# Patient Record
Sex: MALE | Race: BLACK OR AFRICAN AMERICAN | Employment: UNEMPLOYED | URBAN - METROPOLITAN AREA
[De-identification: names, ages, dates, MRNs, and addresses within clinical notes are randomized per-mention and may not be internally consistent; named-entity substitution may affect disease eponyms.]

---

## 2017-07-01 ENCOUNTER — HOSPITAL ENCOUNTER (EMERGENCY)
Age: 32
Discharge: HOME | End: 2017-07-02
Attending: EMERGENCY MEDICINE | Admitting: EMERGENCY MEDICINE
Payer: SELF-PAY

## 2017-07-01 DIAGNOSIS — T40.1X1A HEROIN OVERDOSE, ACCIDENTAL OR UNINTENTIONAL, INITIAL ENCOUNTER (HCC): Primary | ICD-10-CM

## 2017-07-01 PROCEDURE — 93005 ELECTROCARDIOGRAM TRACING: CPT

## 2017-07-01 PROCEDURE — 74011250636 HC RX REV CODE- 250/636: Performed by: EMERGENCY MEDICINE

## 2017-07-01 PROCEDURE — 96360 HYDRATION IV INFUSION INIT: CPT

## 2017-07-01 PROCEDURE — 96361 HYDRATE IV INFUSION ADD-ON: CPT

## 2017-07-01 PROCEDURE — 99285 EMERGENCY DEPT VISIT HI MDM: CPT

## 2017-07-01 RX ADMIN — SODIUM CHLORIDE 1000 ML: 900 INJECTION, SOLUTION INTRAVENOUS at 22:34

## 2017-07-02 VITALS
TEMPERATURE: 98.1 F | RESPIRATION RATE: 11 BRPM | BODY MASS INDEX: 25.45 KG/M2 | HEART RATE: 81 BPM | OXYGEN SATURATION: 95 % | DIASTOLIC BLOOD PRESSURE: 67 MMHG | WEIGHT: 182.5 LBS | SYSTOLIC BLOOD PRESSURE: 93 MMHG

## 2017-07-02 LAB
ALBUMIN SERPL BCP-MCNC: 4 G/DL (ref 3.4–5)
ALBUMIN/GLOB SERPL: 1.1 {RATIO} (ref 0.8–1.7)
ALP SERPL-CCNC: 81 U/L (ref 45–117)
ALT SERPL-CCNC: 34 U/L (ref 16–61)
ANION GAP BLD CALC-SCNC: 11 MMOL/L (ref 3–18)
APAP SERPL-MCNC: <2 UG/ML (ref 10–30)
AST SERPL W P-5'-P-CCNC: 26 U/L (ref 15–37)
ATRIAL RATE: 97 BPM
BASOPHILS # BLD AUTO: 0 K/UL (ref 0–0.06)
BASOPHILS # BLD: 0 % (ref 0–2)
BILIRUB SERPL-MCNC: 0.6 MG/DL (ref 0.2–1)
BUN SERPL-MCNC: 6 MG/DL (ref 7–18)
BUN/CREAT SERPL: 5 (ref 12–20)
CALCIUM SERPL-MCNC: 8.7 MG/DL (ref 8.5–10.1)
CALCULATED P AXIS, ECG09: 64 DEGREES
CALCULATED R AXIS, ECG10: 65 DEGREES
CALCULATED T AXIS, ECG11: 42 DEGREES
CHLORIDE SERPL-SCNC: 107 MMOL/L (ref 100–108)
CK MB CFR SERPL CALC: 0.8 % (ref 0–4)
CK MB SERPL-MCNC: 1.6 NG/ML (ref 5–25)
CK SERPL-CCNC: 209 U/L (ref 39–308)
CO2 SERPL-SCNC: 24 MMOL/L (ref 21–32)
CREAT SERPL-MCNC: 1.27 MG/DL (ref 0.6–1.3)
DIAGNOSIS, 93000: NORMAL
DIFFERENTIAL METHOD BLD: ABNORMAL
EOSINOPHIL # BLD: 0 K/UL (ref 0–0.4)
EOSINOPHIL NFR BLD: 0 % (ref 0–5)
ERYTHROCYTE [DISTWIDTH] IN BLOOD BY AUTOMATED COUNT: 13.4 % (ref 11.6–14.5)
ETHANOL SERPL-MCNC: 67 MG/DL (ref 0–3)
GLOBULIN SER CALC-MCNC: 3.7 G/DL (ref 2–4)
GLUCOSE SERPL-MCNC: 75 MG/DL (ref 74–99)
HCT VFR BLD AUTO: 40.2 % (ref 36–48)
HGB BLD-MCNC: 13.1 G/DL (ref 13–16)
LYMPHOCYTES # BLD AUTO: 14 % (ref 21–52)
LYMPHOCYTES # BLD: 1.5 K/UL (ref 0.9–3.6)
MCH RBC QN AUTO: 28.5 PG (ref 24–34)
MCHC RBC AUTO-ENTMCNC: 32.6 G/DL (ref 31–37)
MCV RBC AUTO: 87.6 FL (ref 74–97)
MONOCYTES # BLD: 0.9 K/UL (ref 0.05–1.2)
MONOCYTES NFR BLD AUTO: 9 % (ref 3–10)
NEUTS SEG # BLD: 7.8 K/UL (ref 1.8–8)
NEUTS SEG NFR BLD AUTO: 77 % (ref 40–73)
P-R INTERVAL, ECG05: 182 MS
PLATELET # BLD AUTO: 240 K/UL (ref 135–420)
PMV BLD AUTO: 9.3 FL (ref 9.2–11.8)
POTASSIUM SERPL-SCNC: 4.3 MMOL/L (ref 3.5–5.5)
PROT SERPL-MCNC: 7.7 G/DL (ref 6.4–8.2)
Q-T INTERVAL, ECG07: 354 MS
QRS DURATION, ECG06: 82 MS
QTC CALCULATION (BEZET), ECG08: 449 MS
RBC # BLD AUTO: 4.59 M/UL (ref 4.7–5.5)
SALICYLATES SERPL-MCNC: 3.6 MG/DL (ref 2.8–20)
SODIUM SERPL-SCNC: 142 MMOL/L (ref 136–145)
TROPONIN I SERPL-MCNC: <0.02 NG/ML (ref 0–0.04)
VENTRICULAR RATE, ECG03: 97 BPM
WBC # BLD AUTO: 10.2 K/UL (ref 4.6–13.2)

## 2017-07-02 PROCEDURE — 80307 DRUG TEST PRSMV CHEM ANLYZR: CPT | Performed by: EMERGENCY MEDICINE

## 2017-07-02 PROCEDURE — 80053 COMPREHEN METABOLIC PANEL: CPT | Performed by: EMERGENCY MEDICINE

## 2017-07-02 PROCEDURE — 82550 ASSAY OF CK (CPK): CPT | Performed by: EMERGENCY MEDICINE

## 2017-07-02 PROCEDURE — 85025 COMPLETE CBC W/AUTO DIFF WBC: CPT | Performed by: EMERGENCY MEDICINE

## 2017-07-02 NOTE — ED TRIAGE NOTES
Patient found unresponsive with respiratory rate of 2.   Patient was given Narcan 2,mg and regained consciousness

## 2017-07-02 NOTE — ED PROVIDER NOTES
[facility] EMERGENCY DEPT      28 y.o. male with noted past medical history who presents to the emergency department via EMS due to a drug overdose just prior to arrival. Per EMS the pt was found unresponsive on the floor with agonal breathing. Pt admits to using IV Heroin. No other complaints. Current Facility-Administered Medications   Medication Dose Route Frequency    sodium chloride 0.9 % bolus infusion 1,000 mL  1,000 mL IntraVENous ONCE     Current Outpatient Prescriptions   Medication Sig    trimethoprim-sulfamethoxazole (BACTRIM DS, SEPTRA DS) 160-800 mg per tablet Take 1 Tab by mouth two (2) times a day.  traMADol (ULTRAM) 50 mg tablet Take 1 Tab by mouth every six (6) hours as needed for Pain. Max Daily Amount: 200 mg. History reviewed. No pertinent past medical history. History reviewed. No pertinent surgical history. History reviewed. No pertinent family history. Social History     Social History    Marital status: SINGLE     Spouse name: N/A    Number of children: N/A    Years of education: N/A     Occupational History    Not on file. Social History Main Topics    Smoking status: Current Every Day Smoker     Packs/day: 0.50    Smokeless tobacco: Never Used    Alcohol use Yes    Drug use: Yes     Special: Heroin    Sexual activity: Not on file     Other Topics Concern    Not on file     Social History Narrative       No Known Allergies    Patient's primary care provider (as noted in EPIC):  None    REVIEW OF SYSTEMS:    Constitutional:  Negative for diaphoresis. HENT:  Negative for congestion. Respiratory:  Negative for cough and shortness of breath. Cardiovascular:  Negative for chest pain and palpitations. Gastrointestinal:  Negative for diarrhea. Genitourinary:  Negative for flank pain. Musculoskeletal:  Negative for back pain. Skin:  Negative for pallor. Neurological:  Negative for focal numbness, weakness or tingling.   Negative for slurred speech. Visit Vitals    /58    Pulse (!) 105    Temp 98.1 °F (36.7 °C)    Resp 18    Wt 82.8 kg (182 lb 8 oz)    SpO2 93%    BMI 25.45 kg/m2       PHYSICAL EXAM:    CONSTITUTIONAL:  Alert, in no apparent distress;  well developed;  well nourished. HEAD:  Normocephalic, atraumatic. EYES:  EOMI. Non-icteric sclera. Normal conjunctiva. ENTM:  Nose:  no rhinorrhea. Throat:  no erythema or exudate, mucous membranes moist.  NECK:  No JVD. Supple  RESPIRATORY:  Chest clear, equal breath sounds, good air movement. CARDIOVASCULAR:  Regular rate and rhythm. No murmurs, rubs, or gallops. GI:  Normal bowel sounds, abdomen soft and non-tender. No rebound or guarding. BACK:  Non-tender. UPPER EXT:  Normal inspection. LOWER EXT:  No edema, no calf tenderness. Distal pulses intact. NEURO:  Moves all four extremities. Normal motor exam and sensation in all four extremities. Normal CN II-XII exam.  Normal bilateral finger-to-nose exam.     SKIN:  No rashes;  Normal for age. PSYCH:  Alert and normal affect. DIFFERENTIAL DIAGNOSES/ MEDICAL DECISION MAKING:  Hypoglycemia, acute alcohol, drug, or multipharmacy intoxication, sepsis from numerous possible sources including urosepsis, pneumonia, meningitis, significant CVA, TIA, intracerebral hemorrhage, subdural hemorrhage, seizure, significant trauma, electrolyte or hormonal imbalance, other etiologies, versus a combination of the above. However, highest on differential diagnoses in this patient is heroin overdose.     ED COURSE:      Abnormal lab results from this emergency department encounter:  Labs Reviewed   CBC WITH AUTOMATED DIFF   METABOLIC PANEL, COMPREHENSIVE   CARDIAC PANEL,(CK, CKMB & TROPONIN)   SALICYLATE   ACETAMINOPHEN   ETHYL ALCOHOL   DRUG SCREEN, URINE       Lab values for this patient within approximately the last 12 hours:  Recent Results (from the past 12 hour(s))   EKG, 12 LEAD, SUBSEQUENT    Collection Time: 07/01/17 10:47 PM   Result Value Ref Range    Ventricular Rate 97 BPM    Atrial Rate 97 BPM    P-R Interval 182 ms    QRS Duration 82 ms    Q-T Interval 354 ms    QTC Calculation (Bezet) 449 ms    Calculated P Axis 64 degrees    Calculated R Axis 65 degrees    Calculated T Axis 42 degrees    Diagnosis       Normal sinus rhythm  Possible Acute pericarditis  Abnormal ECG  No previous ECGs available         Radiologist and cardiologist interpretations if available at time of this note:  No results found. EKG reading by Chase Hooper MD:  NSR about 95 bpm with normal width QRS. No STEMI. No ST depression. Medication(s) ordered for patient during this emergency visit encounter:  Medications   sodium chloride 0.9 % bolus infusion 1,000 mL (not administered)       The patient's mental status was improved with narcan that was given by EMS in the field. During serial examinations in the emergency department, the patient mental status has improved and remained normal.      This was not a suicide attempt and thus the behavioral crisis nurse has not been consulted on this patient. Diagnoses:  1. Overdose of heroin. Plan:    1. Return if worse. 2. Follow up with your primary doctor in next 2-3 days. 3. Seek professional help for your heroin abuse. Daria Mac M.D. Provider Attestation:  If a scribe was utilized in generation of this patient record, I personally performed the services described in the documentation, reviewed the documentation, as recorded by the scribe in my presence, and it accurately records the patient's history of presenting illness, review of systems, patient physical examination, and procedures performed by me as the attending physician. Daria Mac M.D.   [facility] Board Certified Emergency Physician  7/1/2017.  10:34 PM

## 2017-07-02 NOTE — DISCHARGE INSTRUCTIONS
Plan:    1. Return if worse. 2. Follow up with your primary doctor in next 2-3 days. 3. Seek professional help for your heroin abuse. Learning About Naloxone for Opioid Overdose  What is naloxone? Opioids are strong pain medicines. Examples include hydrocodone, oxycodone, fentanyl, and morphine. Heroin is an example of an illegal opioid. Taking too much of an opioid can cause death. An overdose is an emergency. Naloxone is a medicine that reverses the effects of an overdose. If you take it soon enough after an overdose, it can save your life. Naloxone comes in a rescue kit you can carry with you. You may hear it called a Narcan kit for short. The rescue kit may contain:  · The medicine. · Syringes and needles. · A nasal adapter for the syringes. · A separate nasal spray device. Your doctor can give you a prescription for a rescue kit and show you how to use it. In some places you can buy Narcan kits without a prescription. When is naloxone used? Naloxone is used when a person shows signs of an opioid overdose. A person may have overdosed if he or she is:  · Sleepy or hard to wake up. · Confused. · Not breathing normally. Make sure your family and friends know about these signs of an overdose. If someone appears to have overdosed, call 911. A drug overdose is an emergency. How do you use it? If you overdose, you may not be able to give yourself the medicine. So it's very important to teach friends and family how and when to give it to you. Rescue kits come with instructions. There are two ways to give the medicine. Many rescue kits can be used for either method. The methods are:  · Injection with needle and syringe. ¨ Training may be needed in order to use this method correctly. ¨ Some rescue kits come with automatic injectors that don't require special training. ¨ The medicine can be injected through clothing. · Nasal spray. ¨ Many rescue kits come with a nasal adapter.  It attaches to the syringe and turns the medicine into a mist.  ¨ The mist is sprayed into the nose of a person who has overdosed. The person needs to be lying down when the mist is sprayed. Overdose symptoms may return a few minutes after the first dose from the rescue kit. If that happens, a second dose is needed. Rescue kits come with two doses for that reason. Keep your rescue kit with you always. You never know when you might need it. If you think you or someone else may have overdosed but you're not sure, use the kit anyway. Aside from going through withdrawal, which may be uncomfortable, there is no downside to using this medicine. Always go to the emergency room after using naloxone. Doctors will want to make sure the overdose has been reversed. Follow-up care is a key part of your treatment and safety. Be sure to make and go to all appointments, and call your doctor if you are having problems. It's also a good idea to know your test results and keep a list of the medicines you take. Where can you learn more? Go to http://jamie-wilma.info/. Enter N977 in the search box to learn more about \"Learning About Naloxone for Opioid Overdose. \"  Current as of: December 30, 2016  Content Version: 11.3  © 9867-4124 [employer]. Care instructions adapted under license by [employer] (which disclaims liability or warranty for this information). If you have questions about a medical condition or this instruction, always ask your healthcare professional. [place] [address] any warranty or liability for your use of this information. [employer] Activation    Thank you for requesting access to [employer]. Please follow the instructions below to securely access and download your online medical record. [employer] allows you to send messages to your doctor, view your test results, renew your prescriptions, schedule appointments, and more. How Do I Sign Up? 1.  In your internet browser, go to [email or web address]. 2. Click on the First Time User? Click Here link in the Sign In box. You will see the New Member Sign Up page. 3. Enter your [employer] Access Code exactly as it appears below. You will not need to use this code after youve completed the sign-up process. If you do not sign up before the expiration date, you must request a new code. [employer] Access Code: [email or web address]  Expires: 2017  1:26 AM (This is the date your [employer] access code will )    4. Enter the last four digits of your Social Security Number (xxxx) and Date of Birth (mm/dd/yyyy) as indicated and click Submit. You will be taken to the next sign-up page. 5. Create a [employer] ID. This will be your [employer] login ID and cannot be changed, so think of one that is secure and easy to remember. 6. Create a [employer] password. You can change your password at any time. 7. Enter your Password Reset Question and Answer. This can be used at a later time if you forget your password. 8. Enter your e-mail address. You will receive e-mail notification when new information is available in [address] 9. Click Sign Up. You can now view and download portions of your medical record. 10. Click the Download Summary menu link to download a portable copy of your medical information. Additional Information    If you have questions, please visit the Frequently Asked Questions section of the [employer] website at [email or web address] Remember, [employer] is NOT to be used for urgent needs. For medical emergencies, dial 911.

## 2017-07-02 NOTE — ED NOTES
Numerous attempts have been made to establish IV and obtain labs. Have not been successful.  MD gongora

## 2018-03-09 ENCOUNTER — APPOINTMENT (OUTPATIENT)
Dept: GENERAL RADIOLOGY | Age: 33
DRG: 917 | End: 2018-03-09
Attending: EMERGENCY MEDICINE
Payer: SELF-PAY

## 2018-03-09 ENCOUNTER — HOSPITAL ENCOUNTER (INPATIENT)
Age: 33
LOS: 1 days | Discharge: LEFT BEFORE DISCHARGE | DRG: 917 | End: 2018-03-10
Attending: EMERGENCY MEDICINE | Admitting: HOSPITALIST
Payer: SELF-PAY

## 2018-03-09 DIAGNOSIS — T40.1X1A ACCIDENTAL OVERDOSE OF HEROIN, INITIAL ENCOUNTER (HCC): ICD-10-CM

## 2018-03-09 DIAGNOSIS — J69.0 ASPIRATION PNEUMONIA OF LEFT UPPER LOBE, UNSPECIFIED ASPIRATION PNEUMONIA TYPE (HCC): Primary | ICD-10-CM

## 2018-03-09 DIAGNOSIS — J96.01 ACUTE RESPIRATORY FAILURE WITH HYPOXIA (HCC): ICD-10-CM

## 2018-03-09 PROBLEM — T50.901A DRUG OVERDOSE: Status: ACTIVE | Noted: 2018-03-09

## 2018-03-09 LAB
ALBUMIN SERPL-MCNC: 4.7 G/DL (ref 3.4–5)
ALBUMIN/GLOB SERPL: 1.3 {RATIO} (ref 0.8–1.7)
ALP SERPL-CCNC: 87 U/L (ref 45–117)
ALT SERPL-CCNC: 136 U/L (ref 16–61)
AMPHET UR QL SCN: POSITIVE
ANION GAP SERPL CALC-SCNC: 11 MMOL/L (ref 3–18)
AST SERPL-CCNC: 65 U/L (ref 15–37)
BARBITURATES UR QL SCN: NEGATIVE
BASOPHILS # BLD: 0 K/UL (ref 0–0.06)
BASOPHILS NFR BLD: 0 % (ref 0–2)
BENZODIAZ UR QL: NEGATIVE
BILIRUB SERPL-MCNC: 1.1 MG/DL (ref 0.2–1)
BUN SERPL-MCNC: 10 MG/DL (ref 7–18)
BUN/CREAT SERPL: 7 (ref 12–20)
CALCIUM SERPL-MCNC: 8.9 MG/DL (ref 8.5–10.1)
CANNABINOIDS UR QL SCN: POSITIVE
CHLORIDE SERPL-SCNC: 106 MMOL/L (ref 100–108)
CO2 SERPL-SCNC: 22 MMOL/L (ref 21–32)
COCAINE UR QL SCN: NEGATIVE
CREAT SERPL-MCNC: 1.41 MG/DL (ref 0.6–1.3)
DIFFERENTIAL METHOD BLD: NORMAL
EOSINOPHIL # BLD: 0.1 K/UL (ref 0–0.4)
EOSINOPHIL NFR BLD: 2 % (ref 0–5)
ERYTHROCYTE [DISTWIDTH] IN BLOOD BY AUTOMATED COUNT: 12.8 % (ref 11.6–14.5)
GLOBULIN SER CALC-MCNC: 3.6 G/DL (ref 2–4)
GLUCOSE SERPL-MCNC: 122 MG/DL (ref 74–99)
HCT VFR BLD AUTO: 44.7 % (ref 36–48)
HDSCOM,HDSCOM: ABNORMAL
HGB BLD-MCNC: 14.7 G/DL (ref 13–16)
LACTATE BLD-SCNC: 1.1 MMOL/L (ref 0.4–2)
LACTATE BLD-SCNC: 2.9 MMOL/L (ref 0.4–2)
LYMPHOCYTES # BLD: 2 K/UL (ref 0.9–3.6)
LYMPHOCYTES NFR BLD: 41 % (ref 21–52)
MCH RBC QN AUTO: 28.7 PG (ref 24–34)
MCHC RBC AUTO-ENTMCNC: 32.9 G/DL (ref 31–37)
MCV RBC AUTO: 87.3 FL (ref 74–97)
METHADONE UR QL: NEGATIVE
MONOCYTES # BLD: 0.3 K/UL (ref 0.05–1.2)
MONOCYTES NFR BLD: 6 % (ref 3–10)
NEUTS SEG # BLD: 2.5 K/UL (ref 1.8–8)
NEUTS SEG NFR BLD: 51 % (ref 40–73)
OPIATES UR QL: POSITIVE
PCP UR QL: NEGATIVE
PLATELET # BLD AUTO: 171 K/UL (ref 135–420)
PMV BLD AUTO: 10.7 FL (ref 9.2–11.8)
POTASSIUM SERPL-SCNC: 4.2 MMOL/L (ref 3.5–5.5)
PROT SERPL-MCNC: 8.3 G/DL (ref 6.4–8.2)
RBC # BLD AUTO: 5.12 M/UL (ref 4.7–5.5)
SODIUM SERPL-SCNC: 139 MMOL/L (ref 136–145)
WBC # BLD AUTO: 4.9 K/UL (ref 4.6–13.2)

## 2018-03-09 PROCEDURE — 96365 THER/PROPH/DIAG IV INF INIT: CPT

## 2018-03-09 PROCEDURE — 71045 X-RAY EXAM CHEST 1 VIEW: CPT

## 2018-03-09 PROCEDURE — 74011250636 HC RX REV CODE- 250/636

## 2018-03-09 PROCEDURE — 96368 THER/DIAG CONCURRENT INF: CPT

## 2018-03-09 PROCEDURE — 96361 HYDRATE IV INFUSION ADD-ON: CPT

## 2018-03-09 PROCEDURE — 74011250636 HC RX REV CODE- 250/636: Performed by: EMERGENCY MEDICINE

## 2018-03-09 PROCEDURE — 93005 ELECTROCARDIOGRAM TRACING: CPT

## 2018-03-09 PROCEDURE — 96375 TX/PRO/DX INJ NEW DRUG ADDON: CPT

## 2018-03-09 PROCEDURE — 85025 COMPLETE CBC W/AUTO DIFF WBC: CPT | Performed by: EMERGENCY MEDICINE

## 2018-03-09 PROCEDURE — 65660000000 HC RM CCU STEPDOWN

## 2018-03-09 PROCEDURE — 80307 DRUG TEST PRSMV CHEM ANLYZR: CPT | Performed by: EMERGENCY MEDICINE

## 2018-03-09 PROCEDURE — 83605 ASSAY OF LACTIC ACID: CPT

## 2018-03-09 PROCEDURE — 80053 COMPREHEN METABOLIC PANEL: CPT | Performed by: EMERGENCY MEDICINE

## 2018-03-09 PROCEDURE — 87040 BLOOD CULTURE FOR BACTERIA: CPT | Performed by: EMERGENCY MEDICINE

## 2018-03-09 PROCEDURE — 99285 EMERGENCY DEPT VISIT HI MDM: CPT

## 2018-03-09 PROCEDURE — 74011000250 HC RX REV CODE- 250: Performed by: EMERGENCY MEDICINE

## 2018-03-09 RX ORDER — ONDANSETRON 2 MG/ML
4 INJECTION INTRAMUSCULAR; INTRAVENOUS ONCE
Status: COMPLETED | OUTPATIENT
Start: 2018-03-09 | End: 2018-03-09

## 2018-03-09 RX ORDER — NALOXONE HYDROCHLORIDE 1 MG/ML
0.4 INJECTION INTRAMUSCULAR; INTRAVENOUS; SUBCUTANEOUS
Status: DISCONTINUED | OUTPATIENT
Start: 2018-03-09 | End: 2018-03-10 | Stop reason: HOSPADM

## 2018-03-09 RX ORDER — NALOXONE HYDROCHLORIDE 0.4 MG/ML
INJECTION, SOLUTION INTRAMUSCULAR; INTRAVENOUS; SUBCUTANEOUS
Status: COMPLETED
Start: 2018-03-09 | End: 2018-03-09

## 2018-03-09 RX ORDER — SODIUM CHLORIDE 9 MG/ML
30 INJECTION, SOLUTION INTRAVENOUS CONTINUOUS
Status: DISCONTINUED | OUTPATIENT
Start: 2018-03-09 | End: 2018-03-10 | Stop reason: HOSPADM

## 2018-03-09 RX ORDER — CLINDAMYCIN PHOSPHATE 600 MG/50ML
600 INJECTION INTRAVENOUS EVERY 8 HOURS
Status: DISCONTINUED | OUTPATIENT
Start: 2018-03-09 | End: 2018-03-10 | Stop reason: HOSPADM

## 2018-03-09 RX ORDER — SODIUM CHLORIDE 9 MG/ML
100 INJECTION, SOLUTION INTRAVENOUS CONTINUOUS
Status: DISCONTINUED | OUTPATIENT
Start: 2018-03-09 | End: 2018-03-10 | Stop reason: HOSPADM

## 2018-03-09 RX ADMIN — CLINDAMYCIN PHOSPHATE 600 MG: 12 INJECTION, SOLUTION INTRAVENOUS at 20:39

## 2018-03-09 RX ADMIN — WATER 2 G: 1 INJECTION INTRAMUSCULAR; INTRAVENOUS; SUBCUTANEOUS at 20:23

## 2018-03-09 RX ADMIN — SODIUM CHLORIDE 2448 ML: 900 INJECTION, SOLUTION INTRAVENOUS at 19:56

## 2018-03-09 RX ADMIN — ONDANSETRON 4 MG: 2 INJECTION INTRAMUSCULAR; INTRAVENOUS at 20:56

## 2018-03-09 RX ADMIN — SODIUM CHLORIDE 500 MG: 900 INJECTION, SOLUTION INTRAVENOUS at 20:34

## 2018-03-09 RX ADMIN — NALOXONE HYDROCHLORIDE: 0.4 INJECTION, SOLUTION INTRAMUSCULAR; INTRAVENOUS; SUBCUTANEOUS at 19:19

## 2018-03-09 NOTE — ED TRIAGE NOTES
Pt arrived via rescue s/p overdose. Per EMS pt was unresponsive upon arrival and bystander was doing CPR. Per EMS pt had pulse with stable vital signs upon arrival on scene and throughout transport. Rescue inserted NPA and administered 2mg of intranasal narcan prior to arrival. Pt became responsive and pulled out NPA. Pt arrived in ER alert and oriented, responsive to verbal stimuli. Per EMS bystander on scene stated pt was using heroin. Pt denies drug use at this time and the answers he is providing RN as far as name and date of birth is continuously changing. Pt put in as Claude Ehlers Doe at this time because identification cannot be confirmed.

## 2018-03-09 NOTE — ED PROVIDER NOTES
EMERGENCY DEPARTMENT HISTORY AND PHYSICAL EXAM    6:34 PM      Date: 3/9/2018  Patient Name: Anibal Grigsby    History of Presenting Illness     Chief Complaint   Patient presents with    Drug Overdose         History Provided By: Benjamin Lu    Chief Complaint: Overdose  Duration:  PTA  Timing:  N/A  Location: N/A  Quality: N/A  Severity: N/A  Modifying Factors: Woke up with 2 Narcan  Associated Symptoms: Denies pain      Additional History (Context): Anibal Grigsby is a 81 y.o. male presents to the ED for evaluation of probable overdose. Per EMS, pt was found unresponsive by a friend, who called EMS and began CPR. EMS administered 2 of Narcan while en route to the ED which was successful in waking the pt up. Pt has no complaints of pain at this time and is A&O x3. Pt has been reluctant to give information however he did state his name is \"Debbie". PCP: No primary care provider on file. Past History     Past Medical History:  History reviewed. No pertinent past medical history. Past Surgical History:  History reviewed. No pertinent surgical history. Family History:  History reviewed. No pertinent family history. Social History:  Social History   Substance Use Topics    Smoking status: Unknown If Ever Smoked    Smokeless tobacco: None    Alcohol use None       Allergies:  No Known Allergies      Review of Systems     Review of Systems   Constitutional: Negative for diaphoresis and fever. HENT: Negative for congestion and sore throat. Eyes: Negative for pain and itching. Respiratory: Negative for cough and shortness of breath. Cardiovascular: Negative for chest pain and palpitations. Gastrointestinal: Negative for abdominal pain and diarrhea. Endocrine: Negative for polydipsia and polyuria. Genitourinary: Negative for dysuria and hematuria. Musculoskeletal: Negative for arthralgias and myalgias. Skin: Negative for rash and wound. Neurological: Negative for seizures and syncope. Hematological: Does not bruise/bleed easily. Psychiatric/Behavioral: Negative for agitation and hallucinations. Physical Exam     Visit Vitals    BP (!) 168/91 (BP 1 Location: Right arm, BP Patient Position: At rest)    Pulse 80    Temp 97.5 °F (36.4 °C)    Resp 16    SpO2 95%       Physical Exam   Constitutional: He appears well-developed and well-nourished. Groggy, paucity of speech   HENT:   Head: Normocephalic and atraumatic. Eyes: Conjunctivae are normal. No scleral icterus. Neck: Normal range of motion. Neck supple. No JVD present. Cardiovascular: Normal rate, regular rhythm and normal heart sounds. 4 intact extremity pulses   Pulmonary/Chest: Effort normal and breath sounds normal.   Abdominal: Soft. He exhibits no mass. There is no tenderness. Musculoskeletal: Normal range of motion. Lymphadenopathy:     He has no cervical adenopathy. Neurological: He is alert. Eyes open, alert, answers questions   Skin: Skin is warm and dry. Nursing note and vitals reviewed.         Diagnostic Study Results     Labs -  Recent Results (from the past 12 hour(s))   EKG, 12 LEAD, INITIAL    Collection Time: 03/09/18  6:30 PM   Result Value Ref Range    Ventricular Rate 88 BPM    Atrial Rate 88 BPM    P-R Interval 186 ms    QRS Duration 86 ms    Q-T Interval 384 ms    QTC Calculation (Bezet) 464 ms    Calculated P Axis 75 degrees    Calculated R Axis 86 degrees    Calculated T Axis 68 degrees    Diagnosis       Sinus rhythm with marked sinus arrhythmia  Possible Left atrial enlargement  Borderline ECG  No previous ECGs available     CBC WITH AUTOMATED DIFF    Collection Time: 03/09/18  6:35 PM   Result Value Ref Range    WBC 4.9 4.6 - 13.2 K/uL    RBC 5.12 4.70 - 5.50 M/uL    HGB 14.7 13.0 - 16.0 g/dL    HCT 44.7 36.0 - 48.0 %    MCV 87.3 74.0 - 97.0 FL    MCH 28.7 24.0 - 34.0 PG    MCHC 32.9 31.0 - 37.0 g/dL    RDW 12.8 11.6 - 14.5 %    PLATELET 756 676 - 483 K/uL    MPV 10.7 9.2 - 11.8 FL NEUTROPHILS 51 40 - 73 %    LYMPHOCYTES 41 21 - 52 %    MONOCYTES 6 3 - 10 %    EOSINOPHILS 2 0 - 5 %    BASOPHILS 0 0 - 2 %    ABS. NEUTROPHILS 2.5 1.8 - 8.0 K/UL    ABS. LYMPHOCYTES 2.0 0.9 - 3.6 K/UL    ABS. MONOCYTES 0.3 0.05 - 1.2 K/UL    ABS. EOSINOPHILS 0.1 0.0 - 0.4 K/UL    ABS. BASOPHILS 0.0 0.0 - 0.06 K/UL    DF AUTOMATED         Radiologic Studies -   XR CHEST PORT    (Results Pending)     No results found. Medical Decision Making   Initial Medical Decision Making and DDx:  Narcotic overdose most likely. Awoke with Narcan. Also possible causes include hypoglycemia or other metabolic encephalopathy. No head trauma therefore do not suspect intracranial process    ED Course: Progress Notes, Reevaluation, and Consults:    I am the first provider for this patient. I reviewed the vital signs, available nursing notes, past medical history, past surgical history, family history and social history. Vital Signs-Reviewed the patient's vital signs. Pulse Oximetry Analysis - 85% on roomair    Cardiac Monitor:  Rate/Rhythm:  NSR at 80 bpm    EKG: Interpreted by the EP. Time Interpreted: 18:30   Rate: 66   Rhythm: Sinus Rhythm   Interpretation: No acute process    Records Reviewed: Nursing Notes and Old Medical Records (Time of Review: 6:34 PM)    7:14 PM : Pt care transferred to Dr. Sushant Walker, ED provider. History of patient complaint(s), available diagnostic reports and current treatment plan has been discussed thoroughly. Bedside rounding on patient occured : no . Intended disposition of patient : Home  Pending diagnostics reports and/or labs (please list): Observation after 2 Narcan      Diagnosis     Clinical Impression: No diagnosis found.     Disposition: pending    Follow-up Information     None           Patient's Medications    No medications on file     _______________________________    Attestations:  [address] [place] [address] acting as a scribe for and in the presence of Chato Rayo MD      March 09, 2018 at 6:34 PM       Provider Attestation:      I personally performed the services described in the documentation, reviewed the documentation, as recorded by the scribe in my presence, and it accurately and completely records my words and actions.  March 09, 2018 at 6:34 PM - Chato Rayo MD    _______________________________

## 2018-03-10 VITALS
HEART RATE: 95 BPM | RESPIRATION RATE: 20 BRPM | WEIGHT: 180 LBS | SYSTOLIC BLOOD PRESSURE: 130 MMHG | OXYGEN SATURATION: 100 % | DIASTOLIC BLOOD PRESSURE: 75 MMHG | TEMPERATURE: 97.5 F

## 2018-03-10 PROCEDURE — 77030020263 HC SOL INJ SOD CL0.9% LFCR 1000ML

## 2018-03-10 PROCEDURE — 74011250636 HC RX REV CODE- 250/636: Performed by: HOSPITALIST

## 2018-03-10 RX ORDER — ENOXAPARIN SODIUM 100 MG/ML
40 INJECTION SUBCUTANEOUS EVERY 24 HOURS
Status: DISCONTINUED | OUTPATIENT
Start: 2018-03-10 | End: 2018-03-10 | Stop reason: HOSPADM

## 2018-03-10 RX ORDER — ONDANSETRON 2 MG/ML
4 INJECTION INTRAMUSCULAR; INTRAVENOUS
Status: DISCONTINUED | OUTPATIENT
Start: 2018-03-10 | End: 2018-03-10 | Stop reason: HOSPADM

## 2018-03-10 RX ORDER — ACETAMINOPHEN 325 MG/1
650 TABLET ORAL
Status: DISCONTINUED | OUTPATIENT
Start: 2018-03-10 | End: 2018-03-10 | Stop reason: HOSPADM

## 2018-03-10 RX ORDER — LORAZEPAM 2 MG/ML
1 INJECTION INTRAMUSCULAR
Status: DISCONTINUED | OUTPATIENT
Start: 2018-03-10 | End: 2018-03-10 | Stop reason: HOSPADM

## 2018-03-10 RX ADMIN — SODIUM CHLORIDE 100 ML/HR: 900 INJECTION, SOLUTION INTRAVENOUS at 01:00

## 2018-03-10 NOTE — PROGRESS NOTES
Patient ran out of room and went to elevator. I ran after patient and he went down before I could get to him. I called code purple and informed security of what was going on. Patient was seen running down the street. ER stated he got into a car and took off. Patient did not sign AMA papers. He told me his name was Burak. No admission paperwork was done and assessment could not be done due to patient leaving. Pt ripped out IV, it was on bed.

## 2018-03-10 NOTE — H&P
[facility]  Hospitalist Division      History & Physical    Patient: Sunil Aaron MRN: [id number]  [facility]: [id number]    YOB: 1899  Age: 81 y.o. Sex: male    DOA: 3/9/2018 LOS:  LOS: 1 day        DOA: 3/9/2018        Assessment/Plan     Active Problems:    Drug overdose (3/9/2018)        Plan:  1. Drug Overdose - Heroin OD - received Narcan in the ER - better now   2. Aspiration PNA - likely from OD & AMS - IV Abx , IVF - monitor   DVT Px - Lovenox   FC                 HPI:     Alisia Grigsby is a 81 y.o. male who is being admitted for aspiration PNA. Pt was found unresponsive by friend / roommate . Pt admits to injection heroin , says he did this after 3 months & could have taken a larger dose. EMS called - pt given narcan with good response. Pt brought to the ER -- is now awake. CXR shows infiltrate - likely from aspiration , discussed with pt , agreeable for admission , will admit for further eval.     History reviewed. No pertinent past medical history. History reviewed. No pertinent surgical history. History reviewed. No pertinent family history. Social History     Social History    Marital status: SINGLE     Spouse name: N/A    Number of children: N/A    Years of education: N/A     Social History Main Topics    Smoking status: Unknown If Ever Smoked    Smokeless tobacco: None    Alcohol use None    Drug use: Yes    Sexual activity: Not Asked     Other Topics Concern    None     Social History Narrative    None       Prior to Admission medications    Not on File       No Known Allergies    Review of Systems  A comprehensive review of systems was negative except for that written in the History of Present Illness.              Physical Exam:      Visit Vitals    /75    Pulse 95    Temp 97.5 °F (36.4 °C)    Resp 20    Wt 81.6 kg (180 lb)    SpO2 100%       Physical Exam:    Gen: In general, this is a well nourished male in no acute distress  HEENT: Sclerae nonicteric. Oral mucous membranes dry. Dentition normal  Neck: Supple with midline trachea. CV: RRR without murmur or rub appreciated. Resp:Respirations are unlabored without use of accessory muscles. Lung fields bilaterally without wheezes or rhonchi. Abd: Soft, nontender, nondistended. Extrem: Extremities are warm, without cyanosis or clubbing. No pitting pretibial edema  Skin: Warm, no visible rashes. Neuro: Patient is alert, oriented, and cooperative. No obvious focal defects. Moves all 4 extremities. Labs Reviewed:    No results found for this or any previous visit (from the past 24 hour(s)). Imaging Reviewed:    PCXR - 1. Small bilateral perihilar infiltrates.  Chest is otherwise normal.          Guerline Briseno MD  3/11/2018, 11:01 PM

## 2018-03-10 NOTE — ED NOTES
1899: I assumed care of this patient from Dr. Hayder Jay. Patient presented with overdose on likely opiate reversal after Narcan signed out pending her evaluation and sobriety    1916 hrs.  nursing staff reported the patient was desaturating, 87% repeat dose of Narcan here and will continue to monitor. Patient now awake, able tell me his name he has no pain at does endorse shortness of breath. He remains hypoxic on nasal cannula upgraded to an nonrebreather with saturation 95%    Chest x-ray reviewed, LEFT upper infiltrate suspicious for aspiration in this clinical context, ordered lactic acid blood cultures fluid bolus and antibiotics cover for aspiration. Patient monitored, still requiring supplemental oxygen, desats to 86% on room air, will be admitted for further management      Patient's presentation, history, physical exam and laboratory evaluations were reviewed. I felt the patient would benefit from inpatient management and treatment. Consult:  Discussed care with Arelis Roy. Standard discussion; including history of patients chief complaint, available diagnostic results, and treatment course. Patient was accepted to their service. Disposition:    Admitted to medicine service      Portions of this chart were created with Dragon medical speech to text program.   Unrecognized errors may be present.

## 2018-03-10 NOTE — ED NOTES
The Sepsis Screening has been completed on arrival in the Emergency Department.     Vital signs:  Patient Vitals for the past 4 hrs:   Temp Pulse Resp BP SpO2   03/09/18 1930 - (!) 126 29 (!) 124/94 (!) 84 %   03/09/18 1830 97.5 °F (36.4 °C) 80 16 (!) 168/91 95 %       MAP (Monitor): 99    =monitored (data validate)  MAP (Calculated): 117    =calculated (manual entry)    Is patient is 31y/o or older, meets 2 or more ABNORMAL VITAL SIGNS below, associated with SUSPECTED INFECTION?  YES     Temperature < 96.8°F (36°C) or > 100.9°F (38.3°C)   Heart Rate > 90 beats per minute   Respiratory Rate > 20 beats per minute   BP < 90 systolic    MAP < 65    IF ANSWER IS YES, CALL A CODE SEPSIS  POC LACTIC ACID ASAP AND BEGIN NURSE DRIVEN SEPSIS ORDERS WHILE AWAITING PROVIDER

## 2018-03-11 LAB
ATRIAL RATE: 88 BPM
CALCULATED P AXIS, ECG09: 75 DEGREES
CALCULATED R AXIS, ECG10: 86 DEGREES
CALCULATED T AXIS, ECG11: 68 DEGREES
DIAGNOSIS, 93000: NORMAL
P-R INTERVAL, ECG05: 186 MS
Q-T INTERVAL, ECG07: 384 MS
QRS DURATION, ECG06: 86 MS
QTC CALCULATION (BEZET), ECG08: 464 MS
VENTRICULAR RATE, ECG03: 88 BPM

## 2018-03-11 NOTE — DISCHARGE SUMMARY
Pt was admitted to the hosp on 03/09/2018 -- he was agreeable to be admitted & was given 1 dose of Iv Abx for Aspiration PNA   Pt asked nurse to use the restroom & he got up & exited from the ER -- nurse tried to stop him but he got into a car at the curb & left - nurse mentions he did not have IV access at the time of leaving.

## 2018-03-15 LAB
BACTERIA SPEC CULT: NORMAL
BACTERIA SPEC CULT: NORMAL
SERVICE CMNT-IMP: NORMAL
SERVICE CMNT-IMP: NORMAL

## 2019-12-21 ENCOUNTER — HOSPITAL ENCOUNTER (EMERGENCY)
Age: 34
Discharge: HOME | End: 2019-12-21
Attending: EMERGENCY MEDICINE
Payer: SELF-PAY

## 2019-12-21 ENCOUNTER — APPOINTMENT (OUTPATIENT)
Dept: GENERAL RADIOLOGY | Age: 34
End: 2019-12-21
Attending: EMERGENCY MEDICINE
Payer: SELF-PAY

## 2019-12-21 VITALS
WEIGHT: 185 LBS | HEIGHT: 71 IN | HEART RATE: 95 BPM | TEMPERATURE: 97.9 F | OXYGEN SATURATION: 100 % | BODY MASS INDEX: 25.9 KG/M2 | DIASTOLIC BLOOD PRESSURE: 73 MMHG | RESPIRATION RATE: 18 BRPM | SYSTOLIC BLOOD PRESSURE: 117 MMHG

## 2019-12-21 DIAGNOSIS — S20.212A CONTUSION OF RIB ON LEFT SIDE, INITIAL ENCOUNTER: Primary | ICD-10-CM

## 2019-12-21 PROCEDURE — 71100 X-RAY EXAM RIBS UNI 2 VIEWS: CPT

## 2019-12-21 PROCEDURE — 90471 IMMUNIZATION ADMIN: CPT

## 2019-12-21 PROCEDURE — 74011000250 HC RX REV CODE- 250: Performed by: EMERGENCY MEDICINE

## 2019-12-21 PROCEDURE — 90715 TDAP VACCINE 7 YRS/> IM: CPT | Performed by: EMERGENCY MEDICINE

## 2019-12-21 PROCEDURE — 99284 EMERGENCY DEPT VISIT MOD MDM: CPT

## 2019-12-21 PROCEDURE — 74011250637 HC RX REV CODE- 250/637: Performed by: EMERGENCY MEDICINE

## 2019-12-21 PROCEDURE — 74011250636 HC RX REV CODE- 250/636: Performed by: EMERGENCY MEDICINE

## 2019-12-21 RX ORDER — ACETAMINOPHEN 500 MG
1000 TABLET ORAL ONCE
Status: COMPLETED | OUTPATIENT
Start: 2019-12-21 | End: 2019-12-21

## 2019-12-21 RX ORDER — IBUPROFEN 400 MG/1
800 TABLET ORAL ONCE
Status: COMPLETED | OUTPATIENT
Start: 2019-12-21 | End: 2019-12-21

## 2019-12-21 RX ORDER — NAPROXEN 500 MG/1
500 TABLET ORAL 2 TIMES DAILY WITH MEALS
Qty: 14 TAB | Refills: 0 | Status: SHIPPED | OUTPATIENT
Start: 2019-12-21 | End: 2019-12-28

## 2019-12-21 RX ORDER — LIDOCAINE 50 MG/G
PATCH TOPICAL
Qty: 30 EACH | Refills: 0 | Status: SHIPPED | OUTPATIENT
Start: 2019-12-21

## 2019-12-21 RX ORDER — LIDOCAINE 4 G/100G
1 PATCH TOPICAL
Status: DISCONTINUED | OUTPATIENT
Start: 2019-12-21 | End: 2019-12-21 | Stop reason: HOSPADM

## 2019-12-21 RX ADMIN — ACETAMINOPHEN 1000 MG: 500 TABLET, FILM COATED ORAL at 06:00

## 2019-12-21 RX ADMIN — IBUPROFEN 800 MG: 400 TABLET ORAL at 06:00

## 2019-12-21 NOTE — ED NOTES
Pt aaox3 verbalized understanding of discharge instructions, had no questions, agreed to follow up with doctor as directed. Pt left with PD, Pt ambulated out with a steady gait.

## 2019-12-21 NOTE — ED PROVIDER NOTES
EMERGENCY DEPARTMENT HISTORY AND PHYSICAL EXAM    5:40 AM      Date: 12/21/2019  Patient Name: Symone Gomes    History of Presenting Illness     Chief Complaint   Patient presents with    Laceration         History Provided By: Patient      Additional History (Context): Symone Gomes is a 36 y.o. male with No significant past medical history who presents with pain in his left chest.  Was involved with [employer] in a foot pursuit, he ran through a thorny bush and into a fence contacting the firm top part of a chain link fence on the left chest, he is complaining of excruciating chest pain since this happened. Unsure of last tetanus, did not fall, did not hit head, did not hurt his neck, no LOC. PCP: None    Current Facility-Administered Medications   Medication Dose Route Frequency Provider Last Rate Last Dose    ibuprofen (MOTRIN) tablet 800 mg  800 mg Oral ONCE Danyell Champagne MD        acetaminophen (TYLENOL) tablet 1,000 mg  1,000 mg Oral ONCE Danyell Champagne MD        diph,Pertuss(AC),Tet Vac-PF (BOOSTRIX) suspension 0.5 mL  0.5 mL IntraMUSCular PRIOR TO DISCHARGE Danyell Champagne MD         Current Outpatient Medications   Medication Sig Dispense Refill    trimethoprim-sulfamethoxazole (BACTRIM DS, SEPTRA DS) 160-800 mg per tablet Take 1 Tab by mouth two (2) times a day. 20 Tab 0    traMADol (ULTRAM) 50 mg tablet Take 1 Tab by mouth every six (6) hours as needed for Pain. Max Daily Amount: 200 mg. 10 Tab 0       Past History     Past Medical History:  History reviewed. No pertinent past medical history. Past Surgical History:  History reviewed. No pertinent surgical history. Family History:  History reviewed. No pertinent family history. Social History:  Social History     Tobacco Use    Smoking status: Current Every Day Smoker    Smokeless tobacco: Never Used   Substance Use Topics    Alcohol use:  Yes    Drug use: Yes     Types: Heroin       Allergies:  No Known Allergies      Review of Systems       Review of Systems   Constitutional: Negative. Negative for activity change, chills and fever. HENT: Negative. Negative for ear pain, hearing loss and sore throat. Eyes: Negative. Negative for pain and visual disturbance. Respiratory: Negative. Negative for cough, chest tightness and shortness of breath. Cardiovascular: Positive for chest pain. Negative for leg swelling. Gastrointestinal: Negative. Negative for abdominal distention and abdominal pain. Genitourinary: Negative. Negative for dysuria and hematuria. Musculoskeletal: Negative. Skin: Positive for wound. Negative for rash. Neurological: Negative. Negative for dizziness and headaches. Psychiatric/Behavioral: Negative. Negative for agitation and behavioral problems. Physical Exam     Visit Vitals  /73 (BP 1 Location: Left arm, BP Patient Position: At rest)   Pulse 95   Temp 97.9 °F (36.6 °C)   Resp 18   Ht 5' 11\" (1.803 m)   Wt 83.9 kg (185 lb)   SpO2 100%   BMI 25.80 kg/m²         Physical Exam  Constitutional:       Appearance: He is well-developed. HENT:      Head: Normocephalic and atraumatic. Mouth/Throat:      Mouth: Mucous membranes are moist.   Eyes:      General:         Right eye: No discharge. Left eye: No discharge. Pupils: Pupils are equal, round, and reactive to light. Neck:      Musculoskeletal: Normal range of motion and neck supple. Vascular: No JVD. Trachea: No tracheal deviation. Comments: No midline C-spine tenderness  Cardiovascular:      Rate and Rhythm: Normal rate and regular rhythm. Heart sounds: Normal heart sounds. No murmur. Comments: Chest wall tender to palpation on the left  Pulmonary:      Effort: Pulmonary effort is normal. No respiratory distress. Breath sounds: Normal breath sounds. No wheezing or rales. Abdominal:      General: Bowel sounds are normal. There is no distension. Palpations: Abdomen is soft. Tenderness: There is no tenderness. There is no rebound. Musculoskeletal: Normal range of motion. General: No tenderness or deformity. Skin:     General: Skin is warm and dry. Findings: No erythema or rash. Comments: Few tiny abrasions over bridge of nose, on the left aspect of the nose, just above the eyelid, abrasion over the left chest in the anterior axillary line, approximately 4 cm in length at approximately rib 6, exquisitely tender to touch   Neurological:      Mental Status: He is alert and oriented to person, place, and time. Cranial Nerves: No cranial nerve deficit. Comments: 5/5 strength UE/LE, 5/5 sensation UE/LE     Psychiatric:         Behavior: Behavior normal.           Diagnostic Study Results     Labs -  No results found for this or any previous visit (from the past 12 hour(s)). Radiologic Studies -   XR RIBS LT UNI 2 V    (Results Pending)         Medical Decision Making   I am the first provider for this patient. I reviewed the vital signs, available nursing notes, past medical history, past surgical history, family history and social history. Vital Signs-Reviewed the patient's vital signs. Records Reviewed: Nursing Notes and Old Medical Records      Provider Notes (Medical Decision Making):     MDM  Number of Diagnoses or Management Options  Diagnosis management comments: Differential Diagnosis: Rib contusion, rib fracture, abrasion    Patient was running from the police when he ran into a chain link fence contacting left chest on the strong upper support, patient exquisitely tender, do not suspect pneumothorax based on equal bilateral breath sounds, normal sats, normal respiratory rate, will obtain x-ray to rule out rib fracture although this will not , will prescribe Lidoderm patches, naproxen, will likely be safe for discharge.   Will update tetanus since patient unsure of last    Reevaluation: 5:43 AM : Pt care transferred to Dr. Severiano Dare  ,ED provider. History of patient complaint(s), available diagnostic reports and current treatment plan has been discussed thoroughly. Bedside rounding on patient occured : yes . Intended disposition of patient : police custody  Pending diagnostics reports and/or labs (please list): rib series, tdap update        Diagnosis     Clinical Impression:   1. Contusion of rib on left side, initial encounter        Disposition: likely dc into police custody    Follow-up Information    None          Patient's Medications   Start Taking    No medications on file   Continue Taking    TRAMADOL (ULTRAM) 50 MG TABLET    Take 1 Tab by mouth every six (6) hours as needed for Pain. Max Daily Amount: 200 mg. TRIMETHOPRIM-SULFAMETHOXAZOLE (BACTRIM DS, SEPTRA DS) 160-800 MG PER TABLET    Take 1 Tab by mouth two (2) times a day. These Medications have changed    No medications on file   Stop Taking    No medications on file     _______________________________    Please note that this dictation was completed with [employer], the computer voice recognition software. Quite often unanticipated grammatical, syntax, homophones, and other interpretive errors are inadvertently transcribed by the computer software. Please disregard these errors. Please excuse any errors that have escaped final proofreading.

## 2019-12-21 NOTE — ED TRIAGE NOTES
Pt aaox3 brought in by PD and EMS after he ran from [employer] and ran into a fence and a bush, Pt has a small superficial lac above the left eye. Pt also has an abrasion to both sides of the chest with reproducible pain. Pt denies LOC, Pt in handcuffs.

## 2019-12-21 NOTE — DISCHARGE INSTRUCTIONS
Patient Education        Bruised Rib: Care Instructions  Overview    You can get a bruised rib if you fall or get hit, such as in an accident or while playing sports. The medical term for a bruise is \"contusion. \" Small blood vessels get torn and leak blood under the skin. Most people think of a bruise as a black-and-blue area. But bones and muscles can also get bruised. An injury may damage the rib but not cause a bruise that you can see. Sometimes it can be hard to tell if a rib is bruised or broken. The symptoms may be the same. And a broken bone can't always be seen on an X-ray. But the treatment for a bruised rib is often the same as treatment for a broken one. An injury to the ribs can cause pain. The pain may be worse when you breathe deeply, cough, or sneeze. In most cases, a bruised rib will heal on its own. You can take pain medicine while the rib mends. Pain relief allows you to take deep breaths. Follow-up care is a key part of your treatment and safety. Be sure to make and go to all appointments, and call your doctor if you are having problems. It's also a good idea to know your test results and keep a list of the medicines you take. How can you care for yourself at home? · Rest and protect the injured or sore area. Stop, change, or take a break from any activity that causes pain. · Put ice or a cold pack on the area for 10 to 20 minutes at a time. Put a thin cloth between the ice and your skin. · After 2 or 3 days, if your swelling is gone, put a heating pad set on low or a warm cloth on your chest. Some doctors suggest that you go back and forth between hot and cold. Put a thin cloth between the heating pad and your skin. · Ask your doctor if you can take an over-the-counter pain medicine, such as acetaminophen (Tylenol), ibuprofen (Advil, Motrin), or naproxen (Aleve). Be safe with medicines. Read and follow all instructions on the label.   · As your pain gets better, slowly return to your normal activities. Be patient. Rib bruises can take weeks or months to heal. If the pain gets worse, it may be a sign that you need to rest a while longer. When should you call for help? Call 911 anytime you think you may need emergency care. For example, call if:    · You have severe trouble breathing.     Call your doctor now or seek immediate medical care if:    · You have trouble breathing.     · You have a fever.     · You have a new or worse cough.     · You have new or worse pain.    Watch closely for changes in your health, and be sure to contact your doctor if:    · You do not get better as expected. Where can you learn more? Go to http://jamie-wilma.info/. Enter R125 in the search box to learn more about \"Bruised Rib: Care Instructions. \"  Current as of: June 26, 2019  Content Version: 12.2  © 7401-9568 [employer]. Care instructions adapted under license by [employer] (which disclaims liability or warranty for this information). If you have questions about a medical condition or this instruction, always ask your healthcare professional. [address] any warranty or liability for your use of this information.

## 2020-01-06 NOTE — ED NOTES
ED Course as of Dec 21 0624   Sat Dec 21, 2019   7209 Dr. Jaycee Monte notified me the patient, injuries to his face while running from police, just waiting on tetanus shot, computer will not let us print instructions until this is done. Patient refused his tetanus shot so we printed his instructions. I have completed the prisoner release form, although I really did not take care of the patient.   Saw him briefly to visualize abrasions to face, minor.    [CB]      ED Course User Index  [CB] Citlaly Burgess MD Pt was cleansed of bowel movement and placed into a clean brief and then was turned onto left side for Aundrea Tripp  01/06/20 9455

## 2021-01-29 ENCOUNTER — APPOINTMENT (OUTPATIENT)
Dept: CT IMAGING | Age: 36
End: 2021-01-29
Attending: EMERGENCY MEDICINE
Payer: MEDICAID

## 2021-01-29 ENCOUNTER — HOSPITAL ENCOUNTER (EMERGENCY)
Age: 36
Discharge: HOME | End: 2021-01-30
Attending: EMERGENCY MEDICINE
Payer: MEDICAID

## 2021-01-29 DIAGNOSIS — S00.03XA CONTUSION OF SCALP, INITIAL ENCOUNTER: ICD-10-CM

## 2021-01-29 DIAGNOSIS — S01.01XA LACERATION OF SCALP, INITIAL ENCOUNTER: Primary | ICD-10-CM

## 2021-01-29 PROCEDURE — 70450 CT HEAD/BRAIN W/O DYE: CPT

## 2021-01-29 PROCEDURE — 75810000293 HC SIMP/SUPERF WND  RPR

## 2021-01-29 PROCEDURE — 99283 EMERGENCY DEPT VISIT LOW MDM: CPT

## 2021-01-30 VITALS
DIASTOLIC BLOOD PRESSURE: 84 MMHG | WEIGHT: 185 LBS | RESPIRATION RATE: 16 BRPM | SYSTOLIC BLOOD PRESSURE: 124 MMHG | BODY MASS INDEX: 25.9 KG/M2 | TEMPERATURE: 98 F | OXYGEN SATURATION: 100 % | HEIGHT: 71 IN | HEART RATE: 82 BPM

## 2021-01-30 NOTE — ED PROVIDER NOTES
EMERGENCY DEPARTMENT HISTORY AND PHYSICAL EXAM    9:17 PM      Date: 1/29/2021  Patient Name: Alexia Richardson    History of Presenting Illness     Chief Complaint   Patient presents with    Fall    Laceration    Lethargy         History Provided By: Patient    Additional History (Context): Alexia Richardson is a 30 y.o. male who presents with head injury. States he was skateboarding and fell, hitting the back of his head. Denies LOC or any other injury. Denies dizziness, blurry vision, or any other neurological symptoms. Reports heroin use today. PCP: None    Current Outpatient Medications   Medication Sig Dispense Refill    lidocaine (LIDODERM) 5 % Apply patch to the affected area for 12 hours a day and remove for 12 hours a day. 30 Each 0    trimethoprim-sulfamethoxazole (BACTRIM DS, SEPTRA DS) 160-800 mg per tablet Take 1 Tab by mouth two (2) times a day. 20 Tab 0    traMADol (ULTRAM) 50 mg tablet Take 1 Tab by mouth every six (6) hours as needed for Pain. Max Daily Amount: 200 mg. 10 Tab 0       Past History     Past Medical History:  No past medical history on file. Past Surgical History:  No past surgical history on file. Family History:  No family history on file. Social History:  Social History     Tobacco Use    Smoking status: Current Every Day Smoker    Smokeless tobacco: Never Used   Substance Use Topics    Alcohol use: Yes    Drug use: Yes     Types: Heroin       Allergies:  No Known Allergies      Review of Systems       Review of Systems   Constitutional: Negative for chills and fever. HENT: Negative for congestion, ear pain and sore throat. Eyes: Negative for pain. Respiratory: Negative for cough, shortness of breath and wheezing. Cardiovascular: Negative for chest pain and leg swelling. Gastrointestinal: Negative for abdominal pain, diarrhea, nausea and vomiting. Genitourinary: Negative for dysuria and flank pain.    Musculoskeletal: Negative for back pain, gait problem and neck pain. Skin: Negative for color change and rash. Neurological: Positive for headaches (Pain to back of head). Negative for dizziness, weakness and numbness. Hematological: Does not bruise/bleed easily. Psychiatric/Behavioral: Negative for behavioral problems. Physical Exam     Visit Vitals  /89 (BP 1 Location: Left lower arm)   Pulse 88   Temp 98 °F (36.7 °C)   Resp 18   Ht 5' 11\" (1.803 m)   Wt 83.9 kg (185 lb)   SpO2 99%   BMI 25.80 kg/m²         Physical Exam  Constitutional:       General: He is not in acute distress. Appearance: He is well-developed. He is not ill-appearing or toxic-appearing. HENT:      Head: Normocephalic and atraumatic. Right Ear: External ear normal.      Left Ear: External ear normal.      Mouth/Throat:      Mouth: Mucous membranes are moist.   Eyes:      Extraocular Movements: Extraocular movements intact. Pupils: Pupils are equal, round, and reactive to light. Neck:      Musculoskeletal: Normal range of motion and neck supple. Cardiovascular:      Rate and Rhythm: Normal rate and regular rhythm. Pulses: Normal pulses. Heart sounds: Normal heart sounds. Pulmonary:      Effort: Pulmonary effort is normal.      Breath sounds: Normal breath sounds. Abdominal:      General: There is no distension. Palpations: Abdomen is soft. There is no mass. Tenderness: There is no abdominal tenderness. There is no guarding. Musculoskeletal: Normal range of motion. General: No swelling or deformity. Skin:     General: Skin is warm and dry. Capillary Refill: Capillary refill takes less than 2 seconds. Findings: No bruising or rash. Comments: 3cm linear laceration to posterior parietal R scalp   Neurological:      General: No focal deficit present. Mental Status: He is oriented to person, place, and time.       Comments: Mildly drowsy   Psychiatric:         Mood and Affect: Mood normal. Behavior: Behavior normal.           Diagnostic Study Results     Labs -  No results found for this or any previous visit (from the past 12 hour(s)). Radiologic Studies -   CT HEAD WO CONT    (Results Pending)   Prelim read: No intracranial hemorrhage, acute ischemia, midline shift, or mass effect. No skull fracture. Minimal posterior scalp contusion/laceration. Medical Decision Making   I reviewed available nursing notes, past medical history, past surgical history, family history and social history. Vital Signs-Reviewed the patient's vital signs. Records Reviewed: Nursing Notes and Old Medical Records    Medical Decision Making:  Patient presents with head injury and scalp laceration, will obtain CT head and irrigate/repair laceration. ED Course: Progress Notes, Reevaluation, and Consults:  CT ordered and done. No acute findings. Wound Repair    Date/Time: 1/30/2021 12:49 AM  Performed by: attendingProcedure prep: irrigated with normal saline. Time out: Immediately prior to the procedure a time out was called to verify the correct patient, procedure, equipment, staff and marking as appropriate. .  Location details: scalp  Wound length: 3cm. Anesthesia method: none- staples to scalp. Irrigation solution: saline  Debridement: none  Skin closure: staples (x4)  Patient tolerance: patient tolerated the procedure well with no immediate complications  My total time at bedside, performing this procedure was 1-15 minutes. Patient stable for discharge. Diagnosis     Clinical Impression:   1. Laceration of scalp, initial encounter    2. Contusion of scalp, initial encounter        Disposition: Patient discharged home. DISCHARGE NOTE:     Patient has no new complaints, changes, or physical findings. Care plan outlined and precautions discussed. All results reviewed with the patient. Patient's questions and concerns were addressed.  Patient was instructed and agrees to follow up with their PMD, as well as to return to the ED upon further deterioration. Patient is stable for discharge at this time.     Follow-up Information     Follow up With Specialties Details Why Contact Info    Primary care physician  Schedule an appointment as soon as possible for a visit  As needed     [facility] EMERGENCY DEPT Emergency Medicine Go in 1 week For wound re-check and staple removal [id number]  [phone]           Current Discharge Medication List

## 2021-01-30 NOTE — ED TRIAGE NOTES
Pt states he fell off of a skateboard 45 minutes ago. Laceration noted to back of head. Pt denies any LOC however per triage tech friend in waiting room reports a LOC and patient is not acting himself. Pt is awake and answering questions however appears very lethargic.